# Patient Record
Sex: FEMALE | Race: WHITE | URBAN - METROPOLITAN AREA
[De-identification: names, ages, dates, MRNs, and addresses within clinical notes are randomized per-mention and may not be internally consistent; named-entity substitution may affect disease eponyms.]

---

## 2017-03-09 ENCOUNTER — PROCEDURE VISIT (OUTPATIENT)
Dept: CARDIOLOGY CLINIC | Age: 82
End: 2017-03-09

## 2017-03-09 DIAGNOSIS — I44.2 ATRIOVENTRICULAR BLOCK, COMPLETE (HCC): ICD-10-CM

## 2017-03-09 DIAGNOSIS — Z95.0 PACEMAKER: ICD-10-CM

## 2017-03-09 PROCEDURE — 93280 PM DEVICE PROGR EVAL DUAL: CPT | Performed by: INTERNAL MEDICINE

## 2017-05-19 ENCOUNTER — OFFICE VISIT (OUTPATIENT)
Dept: CARDIOLOGY CLINIC | Age: 82
End: 2017-05-19

## 2017-05-19 VITALS
WEIGHT: 145 LBS | HEART RATE: 80 BPM | HEIGHT: 59 IN | DIASTOLIC BLOOD PRESSURE: 60 MMHG | SYSTOLIC BLOOD PRESSURE: 128 MMHG | BODY MASS INDEX: 29.23 KG/M2

## 2017-05-19 DIAGNOSIS — Z95.0 PACEMAKER: ICD-10-CM

## 2017-05-19 DIAGNOSIS — I48.20 CHRONIC ATRIAL FIBRILLATION (HCC): Primary | ICD-10-CM

## 2017-05-19 DIAGNOSIS — I10 ESSENTIAL HYPERTENSION: ICD-10-CM

## 2017-05-19 PROCEDURE — 99214 OFFICE O/P EST MOD 30 MIN: CPT | Performed by: INTERNAL MEDICINE

## 2017-05-19 PROCEDURE — 1090F PRES/ABSN URINE INCON ASSESS: CPT | Performed by: INTERNAL MEDICINE

## 2017-05-19 PROCEDURE — G8427 DOCREV CUR MEDS BY ELIG CLIN: HCPCS | Performed by: INTERNAL MEDICINE

## 2017-05-19 PROCEDURE — G8598 ASA/ANTIPLAT THER USED: HCPCS | Performed by: INTERNAL MEDICINE

## 2017-05-19 PROCEDURE — 1123F ACP DISCUSS/DSCN MKR DOCD: CPT | Performed by: INTERNAL MEDICINE

## 2017-05-19 PROCEDURE — 93000 ELECTROCARDIOGRAM COMPLETE: CPT | Performed by: INTERNAL MEDICINE

## 2017-05-19 PROCEDURE — 4040F PNEUMOC VAC/ADMIN/RCVD: CPT | Performed by: INTERNAL MEDICINE

## 2017-05-19 PROCEDURE — 1036F TOBACCO NON-USER: CPT | Performed by: INTERNAL MEDICINE

## 2017-05-19 PROCEDURE — G8420 CALC BMI NORM PARAMETERS: HCPCS | Performed by: INTERNAL MEDICINE

## 2017-05-19 RX ORDER — FUROSEMIDE 20 MG/1
TABLET ORAL
Qty: 7 TABLET | Refills: 0 | Status: SHIPPED | OUTPATIENT
Start: 2017-05-19 | End: 2017-11-29

## 2017-06-29 ENCOUNTER — PROCEDURE VISIT (OUTPATIENT)
Dept: CARDIOLOGY CLINIC | Age: 82
End: 2017-06-29

## 2017-06-29 DIAGNOSIS — Z95.0 PACEMAKER: ICD-10-CM

## 2017-06-29 DIAGNOSIS — I44.2 ATRIOVENTRICULAR BLOCK, COMPLETE (HCC): ICD-10-CM

## 2017-06-29 PROCEDURE — 93280 PM DEVICE PROGR EVAL DUAL: CPT | Performed by: INTERNAL MEDICINE

## 2017-09-21 ENCOUNTER — PROCEDURE VISIT (OUTPATIENT)
Dept: CARDIOLOGY CLINIC | Age: 82
End: 2017-09-21

## 2017-09-21 DIAGNOSIS — I44.2 ATRIOVENTRICULAR BLOCK, COMPLETE (HCC): ICD-10-CM

## 2017-09-21 DIAGNOSIS — Z95.0 PACEMAKER: Primary | ICD-10-CM

## 2017-09-21 PROCEDURE — 93280 PM DEVICE PROGR EVAL DUAL: CPT | Performed by: INTERNAL MEDICINE

## 2017-11-29 ENCOUNTER — OFFICE VISIT (OUTPATIENT)
Dept: CARDIOLOGY CLINIC | Age: 82
End: 2017-11-29

## 2017-11-29 VITALS
WEIGHT: 150 LBS | BODY MASS INDEX: 30.24 KG/M2 | DIASTOLIC BLOOD PRESSURE: 72 MMHG | HEIGHT: 59 IN | SYSTOLIC BLOOD PRESSURE: 156 MMHG | HEART RATE: 86 BPM

## 2017-11-29 DIAGNOSIS — I48.20 CHRONIC ATRIAL FIBRILLATION (HCC): ICD-10-CM

## 2017-11-29 DIAGNOSIS — I44.2 ATRIOVENTRICULAR BLOCK, COMPLETE (HCC): ICD-10-CM

## 2017-11-29 DIAGNOSIS — I10 ESSENTIAL HYPERTENSION: Primary | ICD-10-CM

## 2017-11-29 PROCEDURE — G8598 ASA/ANTIPLAT THER USED: HCPCS | Performed by: INTERNAL MEDICINE

## 2017-11-29 PROCEDURE — 1090F PRES/ABSN URINE INCON ASSESS: CPT | Performed by: INTERNAL MEDICINE

## 2017-11-29 PROCEDURE — G8484 FLU IMMUNIZE NO ADMIN: HCPCS | Performed by: INTERNAL MEDICINE

## 2017-11-29 PROCEDURE — 4040F PNEUMOC VAC/ADMIN/RCVD: CPT | Performed by: INTERNAL MEDICINE

## 2017-11-29 PROCEDURE — 99214 OFFICE O/P EST MOD 30 MIN: CPT | Performed by: INTERNAL MEDICINE

## 2017-11-29 PROCEDURE — G8427 DOCREV CUR MEDS BY ELIG CLIN: HCPCS | Performed by: INTERNAL MEDICINE

## 2017-11-29 PROCEDURE — 1036F TOBACCO NON-USER: CPT | Performed by: INTERNAL MEDICINE

## 2017-11-29 PROCEDURE — 1123F ACP DISCUSS/DSCN MKR DOCD: CPT | Performed by: INTERNAL MEDICINE

## 2017-11-29 PROCEDURE — G8417 CALC BMI ABV UP PARAM F/U: HCPCS | Performed by: INTERNAL MEDICINE

## 2017-11-29 NOTE — LETTER
[address] Cardiology [facility]  [address] [place]. [facility] [address]  Phone: [phone]  Fax: [phone]    Briscoe Oppenheim, MD        December 1, 2017     Zunilda Laughlin, [address] [address]    Patient: Manuela Parson  MR Number: [id number]  YOB: 1928  Date of Visit: 11/29/2017    Dear Dr. Zunilda Laughlin:    Thank you for the request for consultation for Maxwell Guevara to me for the evaluation of Ms Anshul Mcintyre Below are the relevant portions of my assessment and plan of care. [address]   Cardiac Evaluation      Patient: Manuela Parson  YOB: 1928  Date: 11/29/17       Chief Complaint   Patient presents with    Atrial Fibrillation    Hypertension        Referring provider: Zunilda Laughlin MD    History of Present Illness:   Mrs. Anshul Mcintyre is seen today for follow up. She has atrial fibrillation, hypertension, CVA, carotid stenosis. 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 She has some effects from CVA. Mrs Anshul Mcintyre states she had a TIA in August, 2014. She states she spent time at [facility] in rehab. She was hospitalized 12/16 w/ sepsis after having a UTI. She then spent 2 months at [facility]. Dunia Manrique presents with her  today. She is in a wheelchair; has ongoing right sided hemiparesis. She denies any chest pain, palpitations, CHANEL, dizziness, or edema. Past Medical History:   has a past medical history of Hypertension and Unspecified cerebral artery occlusion with cerebral infarction. Surgical History:   has a past surgical history that includes Pacemaker insertion; Hysterectomy; Appendectomy; and eye surgery. Social History:  Social History     Social History    Marital status:      Spouse name: N/A    Number of children: N/A    Years of education: N/A     Occupational History    Not on file.      Social History Main Topics Carotid doppler 8/14 ([address]) 1-15% bilateral (recommend repeating but she cannot lie on the table)   4. Atrioventricular block, complete: pacemaker placed. 5. Atrial fibrillation: new onset early June, 2012> she will not take coumadin or Pradaxa (despite previously having a stroke) and is on Plavix, Amiodarone, and Digoxin. High CHADS score  Echo 2014 ([address]) EF 55-60%, grade 1 DD, mild aortic sclerosis  ECHO 6/15/12> mild MR, EF 60%, tech difficult study       Encouraged to take deep breaths regularly. No med changes. FU 6 months. Thank you for allowing to me to participate in the care of [address] [place] [address]. If you have questions, please do not hesitate to call me. I look forward to following Sia Oats along with you.     Sincerely,        Fabiola Elizondo MD

## 2017-11-29 NOTE — PROGRESS NOTES
[address]   Cardiac Evaluation      Patient: Rogers Madrid  YOB: 1928  Date: 11/29/17       Chief Complaint   Patient presents with    Atrial Fibrillation    Hypertension        Referring provider: Yudelka Hernandez MD    History of Present Illness:   Mrs. Brina Guerrero is seen today for follow up. She has atrial fibrillation, hypertension, CVA, carotid stenosis. 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 She has some effects from CVA. Mrs Brina Guerrero states she had a TIA in August, 2014. She states she spent time at [facility] in rehab. She was hospitalized 12/16 w/ sepsis after having a UTI. She then spent 2 months at [facility]. Nicolasa Gamboa presents with her  today. She is in a wheelchair; has ongoing right sided hemiparesis. She denies any chest pain, palpitations, CHANEL, dizziness, or edema. Past Medical History:   has a past medical history of Hypertension and Unspecified cerebral artery occlusion with cerebral infarction. Surgical History:   has a past surgical history that includes Pacemaker insertion; Hysterectomy; Appendectomy; and eye surgery. Social History:  Social History     Social History    Marital status:      Spouse name: N/A    Number of children: N/A    Years of education: N/A     Occupational History    Not on file. Social History Main Topics    Smoking status: Never Smoker    Smokeless tobacco: Never Used    Alcohol use No    Drug use: Unknown    Sexual activity: Not on file     Other Topics Concern    Not on file     Social History Narrative    No narrative on file       Family History:  family history includes Heart Attack in her father. Allergies:  Asa-apap-caff-ca gluc; Beta adrenergic blockers; Inderal [propranolol]; Pcn [penicillins]; Sulfa antibiotics;  Zestril [lisinopril]; and Pradaxa [dabigatran etexilate mesylate]     Current Outpatient Prescriptions on File Prior to Pulse: 86    Weight: 150 lb (68 kg)    Height: 4' 11\" (1.499 m)      Body mass index is 30.3 kg/m². Wt Readings from Last 3 Encounters:   11/29/17 150 lb (68 kg)   05/19/17 145 lb (65.8 kg)   11/11/16 152 lb (68.9 kg)      BP Readings from Last 3 Encounters:   11/29/17 (!) 156/72   05/19/17 128/60   11/11/16 (!) 144/60      Constitutional and General Appearance:  appears stated age  Respiratory:  · Normal excursion and expansion without use of accessory muscles  · Resp Auscultation: crackles in the bases  Cardiovascular:  · The apical impulses not displaced  · Heart is regular in rhythm with normal S1, S2. 1/6 holosystolic murmur  · The carotid upstroke is normal, no bruit noted   · JVP is not elevated  · Peripheral pulses are symmetrical  · There is no clubbing, cyanosis of the extremities  · 1-2+ edema BLE  · Femoral Arteries: 2+ and equal  · Pedal Pulses: 2+ and equal   Abdomen:  · No masses or tenderness  · Normal bowel sounds  Neurological/Psychiatric:  · Alert and oriented x3  · Moves all extremities well  · Exhibits normal gait balance and coordination      Assessment/Plan  1. Pacemaker: Vitatron placed after bradycardia and 2nd & 3rd degree AV block   2. HTN -higher here, at home readings controlled    3. CVA (cerebral infarction): right sided hemiparesis  Carotid doppler 8/14 ([address]) 1-15% bilateral (recommend repeating but she cannot lie on the table)   4. Atrioventricular block, complete: pacemaker placed. 5. Atrial fibrillation: new onset early June, 2012> she will not take coumadin or Pradaxa (despite previously having a stroke) and is on Plavix, Amiodarone, and Digoxin. High CHADS score  Echo 2014 ([address]) EF 55-60%, grade 1 DD, mild aortic sclerosis  ECHO 6/15/12> mild MR, EF 60%, tech difficult study       Encouraged to take deep breaths regularly. No med changes. FU 6 months. Thank you for allowing to me to participate in the care of [address] [place] [address].

## 2017-11-30 ENCOUNTER — PROCEDURE VISIT (OUTPATIENT)
Dept: CARDIOLOGY CLINIC | Age: 82
End: 2017-11-30

## 2017-11-30 DIAGNOSIS — Z95.0 PACEMAKER: ICD-10-CM

## 2017-11-30 DIAGNOSIS — I44.2 ATRIOVENTRICULAR BLOCK, COMPLETE (HCC): ICD-10-CM

## 2017-11-30 PROCEDURE — 93280 PM DEVICE PROGR EVAL DUAL: CPT | Performed by: INTERNAL MEDICINE

## 2017-11-30 NOTE — PROGRESS NOTES
Patient comes in for programming evaluation for her pacemaker. All sensing and pacing parameters are within normal range. No changes need to be made at this time. Please see interrogation for more detail. Patient will follow up in 3 months in office or remotely.

## 2017-12-01 NOTE — COMMUNICATION BODY
[facility]   Cardiac Evaluation      Patient: Millie Patel  YOB: 1928  Date: 11/29/17       Chief Complaint   Patient presents with    Atrial Fibrillation    Hypertension        Referring provider: Francisco Javier Prakash MD    History of Present Illness:   Mrs. Zee Guallpa is seen today for follow up. She has atrial fibrillation, hypertension, CVA, carotid stenosis. 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 She has some effects from CVA. Mrs Zee Guallpa states she had a TIA in August, 2014. She states she spent time at [facility] in rehab. She was hospitalized 12/16 w/ sepsis after having a UTI. She then spent 2 months at [facility]. Francisco Mitchell presents with her  today. She is in a wheelchair; has ongoing right sided hemiparesis. She denies any chest pain, palpitations, CHANEL, dizziness, or edema. Past Medical History:   has a past medical history of Hypertension and Unspecified cerebral artery occlusion with cerebral infarction. Surgical History:   has a past surgical history that includes Pacemaker insertion; Hysterectomy; Appendectomy; and eye surgery. Social History:  Social History     Social History    Marital status:      Spouse name: N/A    Number of children: N/A    Years of education: N/A     Occupational History    Not on file. Social History Main Topics    Smoking status: Never Smoker    Smokeless tobacco: Never Used    Alcohol use No    Drug use: Unknown    Sexual activity: Not on file     Other Topics Concern    Not on file     Social History Narrative    No narrative on file       Family History:  family history includes Heart Attack in her father. Allergies:  Asa-apap-caff-ca gluc; Beta adrenergic blockers; Inderal [propranolol]; Pcn [penicillins]; Sulfa antibiotics;  Zestril [lisinopril]; and Pradaxa [dabigatran etexilate mesylate]     Current Outpatient Prescriptions on File Prior to

## 2018-03-22 ENCOUNTER — PROCEDURE VISIT (OUTPATIENT)
Dept: CARDIOLOGY CLINIC | Age: 83
End: 2018-03-22

## 2018-03-22 DIAGNOSIS — Z95.0 PACEMAKER: ICD-10-CM

## 2018-03-22 DIAGNOSIS — I44.2 ATRIOVENTRICULAR BLOCK, COMPLETE (HCC): ICD-10-CM

## 2018-03-22 DIAGNOSIS — I48.20 CHRONIC ATRIAL FIBRILLATION (HCC): ICD-10-CM

## 2018-03-22 PROCEDURE — 93280 PM DEVICE PROGR EVAL DUAL: CPT | Performed by: INTERNAL MEDICINE

## 2018-06-13 ENCOUNTER — OFFICE VISIT (OUTPATIENT)
Dept: CARDIOLOGY CLINIC | Age: 83
End: 2018-06-13

## 2018-06-13 VITALS
BODY MASS INDEX: 32.25 KG/M2 | DIASTOLIC BLOOD PRESSURE: 80 MMHG | WEIGHT: 160 LBS | SYSTOLIC BLOOD PRESSURE: 150 MMHG | HEART RATE: 60 BPM | HEIGHT: 59 IN

## 2018-06-13 DIAGNOSIS — I44.2 ATRIOVENTRICULAR BLOCK, COMPLETE (HCC): ICD-10-CM

## 2018-06-13 DIAGNOSIS — I10 ESSENTIAL HYPERTENSION: ICD-10-CM

## 2018-06-13 DIAGNOSIS — I48.20 CHRONIC ATRIAL FIBRILLATION (HCC): Primary | ICD-10-CM

## 2018-06-13 PROCEDURE — 1090F PRES/ABSN URINE INCON ASSESS: CPT | Performed by: INTERNAL MEDICINE

## 2018-06-13 PROCEDURE — G8417 CALC BMI ABV UP PARAM F/U: HCPCS | Performed by: INTERNAL MEDICINE

## 2018-06-13 PROCEDURE — 1123F ACP DISCUSS/DSCN MKR DOCD: CPT | Performed by: INTERNAL MEDICINE

## 2018-06-13 PROCEDURE — G8427 DOCREV CUR MEDS BY ELIG CLIN: HCPCS | Performed by: INTERNAL MEDICINE

## 2018-06-13 PROCEDURE — G8598 ASA/ANTIPLAT THER USED: HCPCS | Performed by: INTERNAL MEDICINE

## 2018-06-13 PROCEDURE — 1036F TOBACCO NON-USER: CPT | Performed by: INTERNAL MEDICINE

## 2018-06-13 PROCEDURE — 99214 OFFICE O/P EST MOD 30 MIN: CPT | Performed by: INTERNAL MEDICINE

## 2018-06-13 PROCEDURE — 4040F PNEUMOC VAC/ADMIN/RCVD: CPT | Performed by: INTERNAL MEDICINE

## 2018-06-13 RX ORDER — BRIMONIDINE TARTRATE, TIMOLOL MALEATE 2; 5 MG/ML; MG/ML
1 SOLUTION/ DROPS OPHTHALMIC EVERY 12 HOURS
COMMUNITY

## 2018-08-02 ENCOUNTER — PROCEDURE VISIT (OUTPATIENT)
Dept: CARDIOLOGY CLINIC | Age: 83
End: 2018-08-02

## 2018-08-02 DIAGNOSIS — Z95.0 PACEMAKER: ICD-10-CM

## 2018-08-02 DIAGNOSIS — I44.2 ATRIOVENTRICULAR BLOCK, COMPLETE (HCC): ICD-10-CM

## 2018-08-02 PROCEDURE — 93280 PM DEVICE PROGR EVAL DUAL: CPT | Performed by: INTERNAL MEDICINE

## 2018-11-01 ENCOUNTER — PROCEDURE VISIT (OUTPATIENT)
Dept: CARDIOLOGY CLINIC | Age: 83
End: 2018-11-01
Payer: MEDICARE

## 2018-11-01 DIAGNOSIS — I44.2 ATRIOVENTRICULAR BLOCK, COMPLETE (HCC): ICD-10-CM

## 2018-11-01 DIAGNOSIS — I48.20 CHRONIC ATRIAL FIBRILLATION (HCC): ICD-10-CM

## 2018-11-01 DIAGNOSIS — Z95.0 PACEMAKER: ICD-10-CM

## 2018-11-03 PROCEDURE — 93280 PM DEVICE PROGR EVAL DUAL: CPT | Performed by: INTERNAL MEDICINE

## 2019-01-01 ENCOUNTER — PROCEDURE VISIT (OUTPATIENT)
Dept: CARDIOLOGY CLINIC | Age: 84
End: 2019-01-01
Payer: MEDICARE

## 2019-01-01 ENCOUNTER — NURSE ONLY (OUTPATIENT)
Dept: CARDIOLOGY CLINIC | Age: 84
End: 2019-01-01
Payer: MEDICARE

## 2019-01-01 DIAGNOSIS — I44.2 ATRIOVENTRICULAR BLOCK, COMPLETE (HCC): ICD-10-CM

## 2019-01-01 DIAGNOSIS — I48.20 CHRONIC ATRIAL FIBRILLATION (HCC): ICD-10-CM

## 2019-01-01 DIAGNOSIS — Z95.0 PACEMAKER: ICD-10-CM

## 2019-01-01 PROCEDURE — 93280 PM DEVICE PROGR EVAL DUAL: CPT | Performed by: INTERNAL MEDICINE

## 2019-01-25 ENCOUNTER — OFFICE VISIT (OUTPATIENT)
Dept: CARDIOLOGY CLINIC | Age: 84
End: 2019-01-25
Payer: MEDICARE

## 2019-01-25 VITALS — SYSTOLIC BLOOD PRESSURE: 132 MMHG | DIASTOLIC BLOOD PRESSURE: 70 MMHG | HEART RATE: 84 BPM

## 2019-01-25 DIAGNOSIS — Z95.0 PACEMAKER: ICD-10-CM

## 2019-01-25 DIAGNOSIS — I48.20 CHRONIC ATRIAL FIBRILLATION (HCC): Primary | ICD-10-CM

## 2019-01-25 DIAGNOSIS — I10 ESSENTIAL HYPERTENSION: ICD-10-CM

## 2019-01-25 PROCEDURE — G8484 FLU IMMUNIZE NO ADMIN: HCPCS | Performed by: INTERNAL MEDICINE

## 2019-01-25 PROCEDURE — 4040F PNEUMOC VAC/ADMIN/RCVD: CPT | Performed by: INTERNAL MEDICINE

## 2019-01-25 PROCEDURE — 99214 OFFICE O/P EST MOD 30 MIN: CPT | Performed by: INTERNAL MEDICINE

## 2019-01-25 PROCEDURE — 1101F PT FALLS ASSESS-DOCD LE1/YR: CPT | Performed by: INTERNAL MEDICINE

## 2019-01-25 PROCEDURE — G8598 ASA/ANTIPLAT THER USED: HCPCS | Performed by: INTERNAL MEDICINE

## 2019-01-25 PROCEDURE — 1090F PRES/ABSN URINE INCON ASSESS: CPT | Performed by: INTERNAL MEDICINE

## 2019-01-25 PROCEDURE — 1123F ACP DISCUSS/DSCN MKR DOCD: CPT | Performed by: INTERNAL MEDICINE

## 2019-01-25 PROCEDURE — 1036F TOBACCO NON-USER: CPT | Performed by: INTERNAL MEDICINE

## 2019-01-25 PROCEDURE — G8427 DOCREV CUR MEDS BY ELIG CLIN: HCPCS | Performed by: INTERNAL MEDICINE

## 2019-01-25 PROCEDURE — G8417 CALC BMI ABV UP PARAM F/U: HCPCS | Performed by: INTERNAL MEDICINE

## 2019-04-25 NOTE — PROGRESS NOTES
Patient comes in for programming evaluation for her pacemaker. All sensing and pacing parameters are within normal range. Pt has known AF. No anti- coagulation as pt is a fall risk. No changes need to be made at this time. Please see interrogation for more detail. Patient will follow up in 3 months in office or remotely.

## 2019-07-25 NOTE — PROGRESS NOTES
Patient comes in for programming evaluation for her pacemaker. All sensing and pacing parameters are within normal range. Pt has known AF. Not on anti coagulation meds because of fall risk. No changes need to be made at this time. Please see interrogation for more detail. Patient will follow up in 3 months in office or remotely.

## 2020-02-04 ENCOUNTER — TELEPHONE (OUTPATIENT)
Dept: CARDIOLOGY CLINIC | Age: 85
End: 2020-02-04